# Patient Record
Sex: FEMALE | Race: BLACK OR AFRICAN AMERICAN | Employment: FULL TIME | URBAN - METROPOLITAN AREA
[De-identification: names, ages, dates, MRNs, and addresses within clinical notes are randomized per-mention and may not be internally consistent; named-entity substitution may affect disease eponyms.]

---

## 2022-09-12 ENCOUNTER — HOSPITAL ENCOUNTER (EMERGENCY)
Age: 57
Discharge: HOME | End: 2022-09-12
Attending: STUDENT IN AN ORGANIZED HEALTH CARE EDUCATION/TRAINING PROGRAM
Payer: COMMERCIAL

## 2022-09-12 VITALS
HEART RATE: 70 BPM | TEMPERATURE: 97.9 F | OXYGEN SATURATION: 99 % | RESPIRATION RATE: 18 BRPM | DIASTOLIC BLOOD PRESSURE: 88 MMHG | SYSTOLIC BLOOD PRESSURE: 159 MMHG

## 2022-09-12 DIAGNOSIS — U07.1 COVID-19: Primary | ICD-10-CM

## 2022-09-12 LAB — SARS-COV-2, NAAT: DETECTED

## 2022-09-12 PROCEDURE — 87635 SARS-COV-2 COVID-19 AMP PRB: CPT

## 2022-09-12 PROCEDURE — 99283 EMERGENCY DEPT VISIT LOW MDM: CPT

## 2022-09-12 ASSESSMENT — ENCOUNTER SYMPTOMS
VOMITING: 0
NAUSEA: 0
SORE THROAT: 0
ABDOMINAL PAIN: 0
SHORTNESS OF BREATH: 0
CHEST TIGHTNESS: 0

## 2022-09-12 NOTE — Clinical Note
Christine Johnson was seen and treated in our emergency department on 9/12/2022. She may return to work on 09/19/2022. Need a negative test prior to returning back to work at this time. If you have any questions or concerns, please don't hesitate to call.       Luis Taylor MD

## 2022-09-13 NOTE — ED TRIAGE NOTES
Pt presents to the ED after being exposed to family on 9/10 who have COVID. Pt denies any symptoms and is requesting test to be able to take family member to doctors appointment tomorrow.

## 2022-09-13 NOTE — ED PROVIDER NOTES
[facility]  EMERGENCY DEPARTMENT ENCOUNTER          ATTENDING PHYSICIAN NOTE       Date of evaluation: 9/12/2022    Chief Complaint     Covid Testing      History of Present Illness     Dustin Leal is a 59 y.o. female with no significant past medical history who presents for evaluation of a COVID test.  She states that she has a sister tomorrow who is getting evaluated for pulmonary transplant and needs transportation but there was a COVID exposure a few days ago at a family event. Patient denies any symptoms currently. She denies any lightheadedness, syncope. She denies taking any medications on a daily basis. She has been vaccinated x2 and has never had COVID in the past.  She wanted to come get tested out of abundance of safety. Review of Systems     Review of Systems   Constitutional:  Negative for chills and fever. HENT:  Negative for congestion and sore throat. Respiratory:  Negative for chest tightness and shortness of breath. Cardiovascular:  Negative for chest pain and leg swelling. Gastrointestinal:  Negative for abdominal pain, nausea and vomiting. Genitourinary:  Negative for difficulty urinating, flank pain and pelvic pain. Musculoskeletal:  Negative for gait problem and neck pain. Neurological:  Negative for dizziness, syncope, weakness and light-headedness. Hematological:  Negative for adenopathy. Psychiatric/Behavioral:  Negative for confusion. All other systems reviewed and are negative. Past Medical, Surgical, Family, and Social History     She has no past medical history on file. She has no past surgical history on file. Her family history is not on file. She     Medications     Previous Medications    No medications on file       Allergies     She has No Known Allergies. Physical Exam     INITIAL VITALS: BP: (!) 159/88, Temp: 97.9 °F (36.6 °C), Heart Rate: 70, Resp: 18, SpO2: 99 %   Physical Exam  Vitals and nursing note reviewed. Constitutional:       Appearance: She is well-developed. She is not ill-appearing or diaphoretic. HENT:      Head: Normocephalic and atraumatic. Nose: Nose normal.   Eyes:      Extraocular Movements: Extraocular movements intact. Pupils: Pupils are equal, round, and reactive to light. Cardiovascular:      Rate and Rhythm: Normal rate and regular rhythm. Pulmonary:      Effort: Pulmonary effort is normal.      Breath sounds: Normal breath sounds. Abdominal:      Palpations: Abdomen is soft. Musculoskeletal:         General: No tenderness or deformity. Normal range of motion. Cervical back: Neck supple. Skin:     General: Skin is warm and dry. Findings: No erythema. Neurological:      Mental Status: She is alert and oriented to person, place, and time. Cranial Nerves: No cranial nerve deficit. Coordination: Coordination normal.   Psychiatric:         Mood and Affect: Mood normal.       Diagnostic Results         RADIOLOGY:  No orders to display       LABS:   Results for orders placed or performed during the hospital encounter of 09/12/22   COVID-19, Rapid    Specimen: Nasopharyngeal Swab   Result Value Ref Range    SARS-CoV-2, NAAT DETECTED (AA) Not Detected       ED BEDSIDE ULTRASOUND:  No results found. RECENT VITALS:  BP: (!) 159/88,Temp: 97.9 °F (36.6 °C), Heart Rate: 70, Resp: 18, SpO2: 99 %     Procedures         ED Course     Nursing Notes, Past Medical Hx, Past Surgical Hx, Social Hx,Allergies, and Family Hx were reviewed. patient was given the following medications:  No orders of the defined types were placed in this encounter. CONSULTS:  None    MEDICAL DECISIONMAKING / ASSESSMENT / PLAN     Mica Cortes is a 59 y.o. female with no underlying medical problems coming in for COVID test today.   Her COVID test today came back positive and she was given instructions for return of symptoms as well as preventative measures to take at home around her family and friends. Clinical Impression     1. COVID-19        Disposition     PATIENT REFERRED TO:  No follow-up provider specified.     DISCHARGE MEDICATIONS:  New Prescriptions    No medications on file       DISPOSITION Decision To Discharge 09/12/2022 10:17:39 PM         Moises Teixeira MD  09/12/22 [id number]

## 2022-09-17 ENCOUNTER — HOSPITAL ENCOUNTER (EMERGENCY)
Age: 57
Discharge: HOME | End: 2022-09-17
Attending: EMERGENCY MEDICINE
Payer: COMMERCIAL

## 2022-09-17 VITALS
OXYGEN SATURATION: 98 % | HEART RATE: 97 BPM | DIASTOLIC BLOOD PRESSURE: 100 MMHG | RESPIRATION RATE: 18 BRPM | SYSTOLIC BLOOD PRESSURE: 159 MMHG | TEMPERATURE: 98.2 F

## 2022-09-17 DIAGNOSIS — U07.1 COVID-19: Primary | ICD-10-CM

## 2022-09-17 LAB — SARS-COV-2, NAAT: DETECTED

## 2022-09-17 PROCEDURE — 99283 EMERGENCY DEPT VISIT LOW MDM: CPT

## 2022-09-17 PROCEDURE — 87635 SARS-COV-2 COVID-19 AMP PRB: CPT

## 2022-09-17 ASSESSMENT — PAIN DESCRIPTION - LOCATION: LOCATION: THROAT

## 2022-09-17 ASSESSMENT — PAIN - FUNCTIONAL ASSESSMENT: PAIN_FUNCTIONAL_ASSESSMENT: 0-10

## 2022-09-17 ASSESSMENT — PAIN SCALES - GENERAL: PAINLEVEL_OUTOF10: 4

## 2022-09-17 NOTE — ED PROVIDER NOTES
[address] [facility]          ATTENDING PHYSICIAN NOTE       Date of evaluation: 9/17/2022    Chief Complaint     Pharyngitis and Positive For Covid-19      History of Present Illness     Leslie Martinez is a 59 y.o. female presenting to the emergency department today for reexamination of COVID symptoms. Patient states that she was first diagnosed after an exposure on 9/12. Since that time she has developed mild headache that has improved and now has mild to moderate sore throat with some symptoms of chest congestion. No significant cough or shortness of breath. No fevers. Has been taking acetaminophen and Sudafed at home with improvement in her symptoms. She is requesting repeat testing in order to return to work and is concerned that she may be developing lung involvement/pneumonia. Review of Systems     Pertinent positive and negative findings as documented in the HPI. Otherwise all other systems were reviewed and were negative. Physical Exam     INITIAL VITALS: BP: (!) 159/100, Temp: 98.2 °F (36.8 °C), Heart Rate: 97, Resp: 18, SpO2: 98 %     Nursing note and vitals reviewed. General:  Adult female, alert and appropriately interactive. In no distress. HENT: Normocephalic and atraumatic. External ears normal. Nose appears normal externally. No vocal changes. Eyes: Conjunctivae normal. No scleral icterus. Neck: Neck supple and without edema or asymmetry. No tracheal deviation present. CV: Normal rate. Regular rhythm. S1/S2 auscultated. No murmurs, gallops or rubs. Pulm: Effort normal. Breath sounds clear to auscultation bilaterally. No wheezes. No rales or rhonchi. Musculoskeletal: No edema. No gross deformities. Neurological: Alert and appropriately interactive. Face symmetric, speech without dysarthria or obvious aphasia. Moving all extremities spontaneously. Skin: Warm, dry. No rash. No diaphoresis or erythema.   Psychiatric: Calm and cooperative with appropriate mood and affect. Procedures   Procedures    MEDICAL DECISION MAKING     MDM: Jakub Mcdonald is a 59 y.o. female with history as above presenting to the emergency department today for reexamination after positive COVID testing. She has reassuring vitals and is in no distress. Cardiopulmonary examination is clear and highly doubt any developing pneumonia or severe complication of COVID at this time. She was retested for COVID at her request in order to facilitate return to work. We did discuss minimum 5 days of isolation including need for improving symptoms and being fever free for 24 hours without medications. She is understanding and will continue symptomatic management at home. Discharged in stable condition with written and verbal instructions for which to return to the ED. Clinical Impression     1. COVID-19        Disposition     DISPOSITION Discharge - Pending Orders Complete 09/17/2022 08:03:37 AM        Mercedez Ramires MD  8:16 AM                     Past Medical, Surgical, Family, and Social History     She has no past medical history on file. She has no past surgical history on file. Her family history is not on file. She     Medications     Previous Medications    CALCIUM CARBONATE-VITAMIN D (CALTRATE) 600-400 MG-UNIT TABS PER TAB    Take 1 tablet by mouth daily       Allergies     She has No Known Allergies. ED Course     Nursing Notes, Past Medical Hx, Past Surgical Hx, Social Hx,Allergies, and Family Hx were reviewed. Patient was given the following medications:  No orders of the defined types were placed in this encounter. Diagnostic Results         RECENT VITALS:  BP: (!) 159/100,Temp: 98.2 °F (36.8 °C), Heart Rate: 97, Resp: 18, SpO2: 98 %     RADIOLOGY:  No orders to display       LABS:   No results found for this visit on 09/17/22.     CONSULTS:  None    PATIENT REFERRED TO:  [facility] Emergency Department  [address] [place] [id number]  [phone]    If symptoms worsen      DISCHARGE MEDICATIONS:  New Prescriptions    No medications on file   '      Delmer Rosas MD  09/17/22 [id number]

## 2022-09-17 NOTE — DISCHARGE INSTRUCTIONS
You were seen for sore throat and chest congestion in the setting of a recent diagnosis of COVID. You must continue to stay in isolation for at least 5 days and meet all of the following criteria as well:  - Improving symptoms overall including improvement in your cough, congestion, and sore throat  - No fevers for at least 24 hours without the use of fever reducing medications such as ibuprofen or acetaminophen. Return to the ER for new fevers, difficulty breathing, worsening cough that is not manageable at home, weakness, inability to eat/drink, or any other concerns.

## 2022-09-17 NOTE — LETTER
[facility] Emergency Department  [facility] [address] [address]  Phone: [phone]  Fax: [phone]               September 17, 2022    Patient: Wai Szymanski   YOB: 1965   Date of Visit: 9/17/2022       To Whom It May Concern:    Wai Szymanski was seen and treated in our emergency department on 9/17/2022. She may return to work when asymptomatic, COVID results are negative or per company policy.         Sincerely,       Em Cotton MD        Signature:__________________________________